# Patient Record
Sex: MALE | Race: WHITE | Employment: OTHER | URBAN - METROPOLITAN AREA
[De-identification: names, ages, dates, MRNs, and addresses within clinical notes are randomized per-mention and may not be internally consistent; named-entity substitution may affect disease eponyms.]

---

## 2020-09-20 ENCOUNTER — ANCILLARY PROCEDURE (OUTPATIENT)
Dept: GENERAL RADIOLOGY | Facility: CLINIC | Age: 65
End: 2020-09-20
Attending: PHYSICIAN ASSISTANT

## 2020-09-20 ENCOUNTER — NURSE TRIAGE (OUTPATIENT)
Dept: NURSING | Facility: CLINIC | Age: 65
End: 2020-09-20

## 2020-09-20 ENCOUNTER — OFFICE VISIT (OUTPATIENT)
Dept: URGENT CARE | Facility: URGENT CARE | Age: 65
End: 2020-09-20

## 2020-09-20 VITALS
HEART RATE: 76 BPM | RESPIRATION RATE: 16 BRPM | SYSTOLIC BLOOD PRESSURE: 140 MMHG | OXYGEN SATURATION: 97 % | TEMPERATURE: 97.3 F | WEIGHT: 204.4 LBS | DIASTOLIC BLOOD PRESSURE: 94 MMHG | BODY MASS INDEX: 29.33 KG/M2

## 2020-09-20 DIAGNOSIS — S69.92XA INJURY OF LEFT THUMB, INITIAL ENCOUNTER: Primary | ICD-10-CM

## 2020-09-20 DIAGNOSIS — S60.10XA SUBUNGUAL HEMATOMA OF FINGER, INITIAL ENCOUNTER: ICD-10-CM

## 2020-09-20 DIAGNOSIS — S62.525A CLOSED NONDISPLACED FRACTURE OF DISTAL PHALANX OF LEFT THUMB, INITIAL ENCOUNTER: ICD-10-CM

## 2020-09-20 DIAGNOSIS — S69.92XA INJURY OF LEFT THUMB, INITIAL ENCOUNTER: ICD-10-CM

## 2020-09-20 PROCEDURE — 99204 OFFICE O/P NEW MOD 45 MIN: CPT | Performed by: PHYSICIAN ASSISTANT

## 2020-09-20 PROCEDURE — 73140 X-RAY EXAM OF FINGER(S): CPT | Mod: LT

## 2020-09-20 RX ORDER — TRAMADOL HYDROCHLORIDE 50 MG/1
50 TABLET ORAL EVERY 6 HOURS PRN
Qty: 10 TABLET | Refills: 0 | Status: SHIPPED | OUTPATIENT
Start: 2020-09-20 | End: 2020-09-23

## 2020-09-20 ASSESSMENT — ENCOUNTER SYMPTOMS
ARTHRALGIAS: 1
COLOR CHANGE: 1

## 2020-09-20 NOTE — PATIENT INSTRUCTIONS
Patient Education     Closed Thumb Fracture  You have a broken (fractured) thumb. This causes local pain, swelling, and often bruising. This injury will usually take about 4 to 6 weeks or longer to heal. Thumb fractures may be treated with a splint or cast. This protects the thumb and holds the bone in place while it heals. More serious fractures may need surgery.     If the thumbnail has been severely injured, it may fall off in 1 to 2 weeks. A new thumbnail will usually start to grow back within a month.  Home care  Follow these guidelines when caring for yourself at home:    Keep your arm elevated to reduce pain and swelling. When sitting or lying down elevate your arm above the level of your heart. You can do this by placing your arm on a pillow that rests on your chest or on a pillow at your side. This is most important during the first 2 days (48 hours) after the injury.    Put an ice pack on the injured area. Do this for 20 minutes every 1 to 2 hours the first day for pain relief. You can make an ice pack by wrapping a plastic bag of ice cubes in a thin towel. As the ice melts, be careful that the cast or splint doesn t get wet. Continue using the ice pack 3 to 4 times a day for the next 2 days. Then use the ice pack as needed to ease pain and swelling.    If a splint was put on, leave this in place for the time advised. This will keep the bones from moving out of position.    Keep the cast or splint completely dry at all times. Bathe with your cast or splint out of the water. Protect it with a large plastic bag, rubber-banded at the top end. If a fiberglass cast or splint gets wet, you can dry it with a hair dryer.    You may use acetaminophen or ibuprofen to control pain, unless another pain medicine was prescribed. If you have chronic liver or kidney disease, talk with your healthcare provider before using these medicines. Also talk with your provider if you ve had a stomach ulcer or gastrointestinal  bleeding.    Don t put creams or objects under the cast if you have itching.  Follow-up care  Follow up with your healthcare provider in 1 week, or as advised. This is to make sure the bone is healing the way it should. Talk with your provider about when it is safe to return to sports or work.  X-rays may be taken. You will be told of any new findings that may affect your care.  When to seek medical advice  Call your healthcare provider right away if any of these occur:    The cast or splint cracks    The plaster cast or splint becomes wet or soft    The fiberglass cast or splint stays wet for more than 24 hours    Bad odor from the cast or wound fluid stains the cast    Pain or swelling gets worse    Redness or warmth in the hand    Fingers or hand become cold, blue, numb, or tingly    You can t move your hand or fingers    Skin around cast or splint becomes red    Fever of 100.4 F (38 C) or higher, or as directed by your healthcare provider  Date Last Reviewed: 2/1/2017 2000-2019 [employer]. [address] [place] [address]. All rights reserved. This information is not intended as a substitute for professional medical care. Always follow your healthcare professional's instructions.           Patient Education     Subungual Hematoma  A subungual hematoma is blood under the nail. It can occur when your finger or toe is hit or crushed. It causes the nail to look blue. In some cases, you may fracture the bone under the nail.   If the bruise is small and not too painful, it will heal without treatment. If the bruise is large and painful, you may need to have the blood drained.  If a large area of the nail is damaged, your doctor may want to remove it. If he or she does not remove the nail, it may become loose or fall off in the next 2 weeks. In almost all cases, the nail will grow back from the area under the cuticle called the matrix. This takes a few weeks to start and is complete in about  4 to 6 months for a fingernail and 12 months for a toenail. If the nail bed or matrix was damaged, the nail may grow back with a rough or irregular shape. Sometimes the nail may not regrow at all.  Home care  The following guidelines will help you care for your wound at home:    Apply an ice pack (ice cubes in a plastic bag, wrapped in a thin towel) for no more than 20 minutes every 3 to 6 hours for the first 1 to 2 days. Continue this, as needed, 3 to 4 times a day until the pain and swelling goes away.    If the nail was drained:  ? Keep the nail covered with a clean adhesive bandage for the next 2 days. There may be some oozing of blood during that time, so change the bandage as needed.  ? Rinse the finger or toe once a day under warm running water. Clean any crust away with a cotton-tipped applicator soaked in soapy water.    If the nail was removed:  ? The nail bed (tissue under the nail) is moist, soft and sensitive. This needs to be protected from injury for the first 7 to 10 days until it dries out and becomes hard. Keep it covered with a dressing or adhesive bandage until that time.    Bandages tend to stick to a newly exposed nail bed and can be hard to remove if left in place more than 24 hours. Therefore, unless you were told otherwise, change dressings every 24 hours. Apply petroleum jelly and then a non-stick dressing. This will keep the bandage from sticking and make it easier to remove.  If necessary, soak the dressing off while holding your finger or toe under warm running water.    If an X-ray showed a fracture, protect the finger or toe for 3 to 4 weeks while it is healing.  Here is some information regarding medicine and your wound:    You can take over-the-counter pain medicine such as acetaminophen for pain, unless you were given a different pain medicine to use. Talk with your healthcare provider before using this medicine if you have chronic liver or kidney disease. Also talk with your  provider if you have had a stomach ulcer or digestive tract bleeding, or you are taking blood-thinner medicine.    If you were given antibiotics, take them until they are used up. It is important to finish the antibiotics even if the wound looks better. This will ensure the infection has cleared.  Follow-up care  Follow up with your doctor, or as advised. If the nail was drained, there is a small risk of infection. Watch carefully for the signs listed below.  When to seek medical advice  Call your doctor right away if any of these occur:    Increasing redness around the nail    Increasing local pain or swelling    Pus draining from the nail    Fever of 100.4 F (38 C) or higher, or as directed by your healthcare provider  Date Last Reviewed: 9/1/2016 2000-2019 [employer]. [address] [place] [address], [place] [address]. All rights reserved. This information is not intended as a substitute for professional medical care. Always follow your healthcare professional's instructions.

## 2020-09-20 NOTE — PROGRESS NOTES
SUBJECTIVE:   Alfonso Ferris is a 64 year old male presenting with a chief complaint of   Chief Complaint   Patient presents with     Thumb Discomfort     Happened last night sledge hammer left thumb       He is a new patient of [facility].  Patient hit with 5 lb sledge hammer against a log.  Incident occurred at 9 pm last night.  Patient took tylenol last night.  Been icing.  No previous injury.  No other injuries.  RHD.      PMHx, surgeries and medications reviewed.        Review of Systems   Musculoskeletal: Positive for arthralgias.   Skin: Positive for color change.   All other systems reviewed and are negative.      Past Medical History:   Diagnosis Date     Arthritis     DJD hips     Pain in left hip      History reviewed. No pertinent family history.  Current Outpatient Medications   Medication Sig Dispense Refill     traMADol (ULTRAM) 50 MG tablet Take 1 tablet (50 mg) by mouth every 6 hours as needed for severe pain 10 tablet 0     acetaminophen (TYLENOL) 500 MG tablet Take 1-2 tablets by mouth every 6 hours as needed.       aspirin 81 MG EC tablet Take 1 tablet by mouth daily.  3     bisacodyl (DULCOLAX) 10 MG suppository Place 1 suppository rectally daily as needed. (Patient not taking: Reported on 9/20/2020) 2 suppository 0     enoxaparin (LOVENOX) 40 MG/0.4ML SOLN Inject 0.4 mLs Subcutaneous every 24 hours. (Patient not taking: Reported on 9/20/2020) 5 Syringe 0     miconazole (MICATIN; MICRO GUARD) 2 % powder Apply  topically 2 times daily. After washing and drying 70 g      oxyCODONE (ROXICODONE) 5 MG immediate release tablet Take 1-2 tablets by mouth every 4 hours as needed. (Patient not taking: Reported on 9/20/2020) 80 tablet      senna-docusate (SENOKOT-S;PERICOLACE) 8.6-50 MG per tablet Take 2 tablets by mouth 2 times daily. (Patient not taking: Reported on 9/20/2020) 40 tablet o     Social History     Tobacco Use     Smoking status: Never Smoker     Smokeless tobacco: Never Used   Substance Use  Topics     Alcohol use: Yes     Comment: one-two per week       OBJECTIVE  BP (!) 140/94   Pulse 76   Temp 97.3  F (36.3  C) (Tympanic)   Resp 16   Wt 92.7 kg (204 lb 6.4 oz)   SpO2 97%   BMI 29.33 kg/m      Physical Exam  Vitals signs and nursing note reviewed.   Constitutional:       Appearance: Normal appearance. He is normal weight.   Eyes:      Extraocular Movements: Extraocular movements intact.      Conjunctiva/sclera: Conjunctivae normal.   Cardiovascular:      Rate and Rhythm: Normal rate.   Musculoskeletal:      Comments: Left hand thumb:  Nail discoloration with edema to entire thumb.  Painful ROM.  Thumb opposition intact but decreased.     Skin:     Findings: Bruising present.      Comments: Thumb nail and surrounding tissue with ecchymosis.     Neurological:      General: No focal deficit present.      Mental Status: He is alert.   Psychiatric:         Mood and Affect: Mood normal.         Labs:  No results found for this or any previous visit (from the past 24 hour(s)).    X-Ray was done, my findings are: Tuft fracture,  Xrays personally viewed by myself.      ASSESSMENT:      ICD-10-CM    1. Injury of left thumb, initial encounter  S69.92XA XR Finger Left G/E 2 Views   2. Closed nondisplaced fracture of distal phalanx of left thumb, initial encounter  S62.525A Orthopedic & Spine  Referral     traMADol (ULTRAM) 50 MG tablet   3. Subungual hematoma of finger, initial encounter  S60.10XA traMADol (ULTRAM) 50 MG tablet        Medical Decision Making:    Differential Diagnosis:  MS Injury Pain: fracture, contusion and subungual hematoma.     Serious Comorbid Conditions:  Adult:  None    PLAN:    MS Injury/Pain  Ultram, ortho referral.  Patient placed in a thumb splint.      Followup:    If not improving or if condition worsens, follow up with your Primary Care Provider, In 2  day(s) follow up with  Ortho    Patient Instructions     Patient Education     Closed Thumb Fracture  You have a  broken (fractured) thumb. This causes local pain, swelling, and often bruising. This injury will usually take about 4 to 6 weeks or longer to heal. Thumb fractures may be treated with a splint or cast. This protects the thumb and holds the bone in place while it heals. More serious fractures may need surgery.     If the thumbnail has been severely injured, it may fall off in 1 to 2 weeks. A new thumbnail will usually start to grow back within a month.  Home care  Follow these guidelines when caring for yourself at home:    Keep your arm elevated to reduce pain and swelling. When sitting or lying down elevate your arm above the level of your heart. You can do this by placing your arm on a pillow that rests on your chest or on a pillow at your side. This is most important during the first 2 days (48 hours) after the injury.    Put an ice pack on the injured area. Do this for 20 minutes every 1 to 2 hours the first day for pain relief. You can make an ice pack by wrapping a plastic bag of ice cubes in a thin towel. As the ice melts, be careful that the cast or splint doesn t get wet. Continue using the ice pack 3 to 4 times a day for the next 2 days. Then use the ice pack as needed to ease pain and swelling.    If a splint was put on, leave this in place for the time advised. This will keep the bones from moving out of position.    Keep the cast or splint completely dry at all times. Bathe with your cast or splint out of the water. Protect it with a large plastic bag, rubber-banded at the top end. If a fiberglass cast or splint gets wet, you can dry it with a hair dryer.    You may use acetaminophen or ibuprofen to control pain, unless another pain medicine was prescribed. If you have chronic liver or kidney disease, talk with your healthcare provider before using these medicines. Also talk with your provider if you ve had a stomach ulcer or gastrointestinal bleeding.    Don t put creams or objects under the cast if  you have itching.  Follow-up care  Follow up with your healthcare provider in 1 week, or as advised. This is to make sure the bone is healing the way it should. Talk with your provider about when it is safe to return to sports or work.  X-rays may be taken. You will be told of any new findings that may affect your care.  When to seek medical advice  Call your healthcare provider right away if any of these occur:    The cast or splint cracks    The plaster cast or splint becomes wet or soft    The fiberglass cast or splint stays wet for more than 24 hours    Bad odor from the cast or wound fluid stains the cast    Pain or swelling gets worse    Redness or warmth in the hand    Fingers or hand become cold, blue, numb, or tingly    You can t move your hand or fingers    Skin around cast or splint becomes red    Fever of 100.4 F (38 C) or higher, or as directed by your healthcare provider  Date Last Reviewed: 2/1/2017 2000-2019 [employer]. [address] [place] [address]. All rights reserved. This information is not intended as a substitute for professional medical care. Always follow your healthcare professional's instructions.           Patient Education     Subungual Hematoma  A subungual hematoma is blood under the nail. It can occur when your finger or toe is hit or crushed. It causes the nail to look blue. In some cases, you may fracture the bone under the nail.   If the bruise is small and not too painful, it will heal without treatment. If the bruise is large and painful, you may need to have the blood drained.  If a large area of the nail is damaged, your doctor may want to remove it. If he or she does not remove the nail, it may become loose or fall off in the next 2 weeks. In almost all cases, the nail will grow back from the area under the cuticle called the matrix. This takes a few weeks to start and is complete in about 4 to 6 months for a fingernail and 12 months for a toenail.  If the nail bed or matrix was damaged, the nail may grow back with a rough or irregular shape. Sometimes the nail may not regrow at all.  Home care  The following guidelines will help you care for your wound at home:    Apply an ice pack (ice cubes in a plastic bag, wrapped in a thin towel) for no more than 20 minutes every 3 to 6 hours for the first 1 to 2 days. Continue this, as needed, 3 to 4 times a day until the pain and swelling goes away.    If the nail was drained:  ? Keep the nail covered with a clean adhesive bandage for the next 2 days. There may be some oozing of blood during that time, so change the bandage as needed.  ? Rinse the finger or toe once a day under warm running water. Clean any crust away with a cotton-tipped applicator soaked in soapy water.    If the nail was removed:  ? The nail bed (tissue under the nail) is moist, soft and sensitive. This needs to be protected from injury for the first 7 to 10 days until it dries out and becomes hard. Keep it covered with a dressing or adhesive bandage until that time.    Bandages tend to stick to a newly exposed nail bed and can be hard to remove if left in place more than 24 hours. Therefore, unless you were told otherwise, change dressings every 24 hours. Apply petroleum jelly and then a non-stick dressing. This will keep the bandage from sticking and make it easier to remove.  If necessary, soak the dressing off while holding your finger or toe under warm running water.    If an X-ray showed a fracture, protect the finger or toe for 3 to 4 weeks while it is healing.  Here is some information regarding medicine and your wound:    You can take over-the-counter pain medicine such as acetaminophen for pain, unless you were given a different pain medicine to use. Talk with your healthcare provider before using this medicine if you have chronic liver or kidney disease. Also talk with your provider if you have had a stomach ulcer or digestive tract  bleeding, or you are taking blood-thinner medicine.    If you were given antibiotics, take them until they are used up. It is important to finish the antibiotics even if the wound looks better. This will ensure the infection has cleared.  Follow-up care  Follow up with your doctor, or as advised. If the nail was drained, there is a small risk of infection. Watch carefully for the signs listed below.  When to seek medical advice  Call your doctor right away if any of these occur:    Increasing redness around the nail    Increasing local pain or swelling    Pus draining from the nail    Fever of 100.4 F (38 C) or higher, or as directed by your healthcare provider  Date Last Reviewed: 9/1/2016 2000-2019 [employer]. [address] [place] [address], [place] [address]. All rights reserved. This information is not intended as a substitute for professional medical care. Always follow your healthcare professional's instructions.

## 2020-09-20 NOTE — NURSING NOTE
"Vital signs:  Temp: 97.3  F (36.3  C) Temp src: Tympanic BP: (!) 140/94 Pulse: 76   Resp: 16 SpO2: 97 %       Weight: 92.7 kg (204 lb 6.4 oz)  Estimated body mass index is 29.33 kg/m  as calculated from the following:    Height as of 7/2/12: 1.778 m (5' 10\").    Weight as of this encounter: 92.7 kg (204 lb 6.4 oz).        "

## 2020-09-21 NOTE — TELEPHONE ENCOUNTER
Alfonso and his wife Ya call in to report that the  Tramadol he was prescribed for his thumb injury is not touching the pain.  He broke it in 3 places.   He will need to be reevaluated or contact his provider or contact the orthopedic specialist to get other pain  Medication prescribed. It won't be done by an on call provider and I can not advise doubling his dose of tramadol.  They agree to explore their other options.     Reason for Disposition    [1] SEVERE pain (e.g., excruciating, pain scale 8-10) AND [2] not improved after pain medications    Protocols used: RECENT MEDICAL VISIT FOR INJURY FOLLOW-UP CALL-A-

## 2020-09-22 ENCOUNTER — OFFICE VISIT (OUTPATIENT)
Dept: ORTHOPEDICS | Facility: CLINIC | Age: 65
End: 2020-09-22
Attending: PHYSICIAN ASSISTANT

## 2020-09-22 VITALS
HEIGHT: 71 IN | DIASTOLIC BLOOD PRESSURE: 82 MMHG | WEIGHT: 205 LBS | SYSTOLIC BLOOD PRESSURE: 125 MMHG | BODY MASS INDEX: 28.7 KG/M2

## 2020-09-22 DIAGNOSIS — S62.525A CLOSED NONDISPLACED FRACTURE OF DISTAL PHALANX OF LEFT THUMB, INITIAL ENCOUNTER: ICD-10-CM

## 2020-09-22 PROCEDURE — 99203 OFFICE O/P NEW LOW 30 MIN: CPT | Performed by: FAMILY MEDICINE

## 2020-09-22 ASSESSMENT — MIFFLIN-ST. JEOR: SCORE: 1747

## 2020-09-22 NOTE — PROGRESS NOTES
[facility]   Clinic Visit s Sep 22, 2020    PCP: [facility], [place]    ASSESSMENT/PLAN    ICD-10-CM    1. Closed nondisplaced fracture of distal phalanx of left thumb, initial encounter  S62.525A Orthopedic & Spine  Referral     SPLINT FINGER STAXX SIZE 5     Comminuted but minimally displaced tuft fracture of the left thumb, not appearing to involve the IP joint.  There is some bruising under the nail and erythema about the skin but no clear skin disruption is apparent at this time.  Will treat with splinting, and he has an AlumaFoam splint that he may alternate with a stack splint given today.  May remove for bathing and light activity, otherwise recheck with repeat x-ray in 3 weeks.          Today's Visit:  Alfonso is a 64 year old male who is seen in consultation at the request of Dr. Starks for   Chief Complaint   Patient presents with     Left Hand - Pain       Injury: Patient describes injury as of Saturday night, 830pm, smashed with a sledge hammer.  Patient was splitting l;ogs for fireplace and hit thumb.      Right hand dominant    Location of Pain: left thumb dip joint, nonradiating   Duration of Pain: acute, 5 day(s),   Rating of Pain at worst: 10/10  Rating of Pain Currently: 2/10  Pain is better with: splinted   Pain is worse with: nothing  Treatment so far consists of: ice occasionally, splinted  Associated symptoms: pain  Recent imaging completed: X-rays completed Sunday 9/20.  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       Past Medical History:   Diagnosis Date     Arthritis     DJD hips     Pain in left hip        Patient Active Problem List    Diagnosis Date Noted     OA (osteoarthritis) 07/02/2012     Priority: Medium       Family History   Problem Relation Age of Onset     Coronary Artery Disease Mother      Diabetes Mother      Hypertension Mother      Colon Cancer Father      Cerebrovascular  "Disease Maternal Grandmother        Social History     Socioeconomic History     Marital status:      Spouse name: Not on file     Number of children: Not on file     Years of education: Not on file     Highest education level: Not on file   Occupational History     Not on file   Social Needs     Financial resource strain: Not on file     Food insecurity     Worry: Not on file     Inability: Not on file     Transportation needs     Medical: Not on file     Non-medical: Not on file   Tobacco Use     Smoking status: Never Smoker     Smokeless tobacco: Never Used   Substance and Sexual Activity     Alcohol use: Yes     Comment: one-two per week       Past Surgical History:   Procedure Laterality Date     ARTHROPLASTY MINIMALLY INVASIVE HIP  7/2/2012    Procedure: ARTHROPLASTY MINIMALLY INVASIVE HIP;  Left Total Hip Arthroplasty Minimally Invasive Two Incision;  Surgeon: Ulises Oliver MD;  Location: [facility] OR             Review of Systems   Musculoskeletal: Positive for joint pain.   All other systems reviewed and are negative.        Physical Exam    /82   Ht 1.803 m (5' 11\")   Wt 93 kg (205 lb)   BMI 28.59 kg/m    Constitutional:well-developed, well-nourished, and in no distress.   Cardiovascular: Intact distal pulses.    Neurological: alert. Gait Normal:   Gait, station, stance, and balance appear normal for age  Skin: Skin is warm and dry.   Psychiatric: Mood and affect normal.   Respiratory: unlabored, speaks in full sentences  Lymph: no LAD, no lymphangitis    Left Hand Exam     Tenderness   Left hand tenderness location: Distal phalanx thumb.     Range of Motion   Wrist   Extension: normal   Flexion: normal   Pronation: normal   Supination: normal   Hand   MP Thumb: 20   DIP Thumb: 0     Muscle Strength   The patient has normal left wrist strength.    Other   Erythema: absent  Scars: absent  Sensation: decreased  Pulse: present    Comments:  Subungual bruising.  No significant erythema or " purulence.  Moderate swelling about the thumb, mostly distal to IP joint.  Decreased sensation but intact to pressure.                 X-ray images Previously done and independently reviewed by me in the office today with the patient. X-ray shows:   Recent Results (from the past 744 hour(s))   XR Finger Left G/E 2 Views    Narrative    LEFT FINGER TWO OR MORE VIEWS  9/20/2020 10:48 AM     HISTORY: Injury of left thumb, initial encounter.    COMPARISON: None.      Impression    IMPRESSION: Comminuted and minimally distracted fracture through the  distal phalanx of the first digit. There are two prominent fracture  fragments.    TANNER BUI MD

## 2020-09-22 NOTE — LETTER
9/22/2020         RE: Alfonso Ferris  [address]  [facility] [address]        Dear Colleague,    Thank you for referring your patient, Alfonso Ferris, to the  [facility]. Please see a copy of my visit note below.    [facility]   Clinic Visit s Sep 22, 2020    PCP: [facility] [place]    ASSESSMENT/PLAN    ICD-10-CM    1. Closed nondisplaced fracture of distal phalanx of left thumb, initial encounter  S62.525A Orthopedic & Spine  Referral     SPLINT FINGER STAXX SIZE 5     Comminuted but minimally displaced tuft fracture of the left thumb, not appearing to involve the IP joint.  There is some bruising under the nail and erythema about the skin but no clear skin disruption is apparent at this time.  Will treat with splinting, and he has an AlumaFoam splint that he may alternate with a stack splint given today.  May remove for bathing and light activity, otherwise recheck with repeat x-ray in 3 weeks.          Today's Visit:  Alfonso is a 64 year old male who is seen in consultation at the request of Dr. Starks for   Chief Complaint   Patient presents with     Left Hand - Pain       Injury: Patient describes injury as of Saturday night, 830pm, smashed with a sledge hammer.  Patient was splitting l;ogs for fireplace and hit thumb.      Right hand dominant    Location of Pain: left thumb dip joint, nonradiating   Duration of Pain: acute, 5 day(s),   Rating of Pain at worst: 10/10  Rating of Pain Currently: 2/10  Pain is better with: splinted   Pain is worse with: nothing  Treatment so far consists of: ice occasionally, splinted  Associated symptoms: pain  Recent imaging completed: X-rays completed Sunday 9/20.  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       Past Medical History:   Diagnosis Date     Arthritis     DJD hips     Pain in left hip        Patient Active Problem List    Diagnosis Date  "Noted     OA (osteoarthritis) 07/02/2012     Priority: Medium       Family History   Problem Relation Age of Onset     Coronary Artery Disease Mother      Diabetes Mother      Hypertension Mother      Colon Cancer Father      Cerebrovascular Disease Maternal Grandmother        Social History     Socioeconomic History     Marital status:      Spouse name: Not on file     Number of children: Not on file     Years of education: Not on file     Highest education level: Not on file   Occupational History     Not on file   Social Needs     Financial resource strain: Not on file     Food insecurity     Worry: Not on file     Inability: Not on file     Transportation needs     Medical: Not on file     Non-medical: Not on file   Tobacco Use     Smoking status: Never Smoker     Smokeless tobacco: Never Used   Substance and Sexual Activity     Alcohol use: Yes     Comment: one-two per week       Past Surgical History:   Procedure Laterality Date     ARTHROPLASTY MINIMALLY INVASIVE HIP  7/2/2012    Procedure: ARTHROPLASTY MINIMALLY INVASIVE HIP;  Left Total Hip Arthroplasty Minimally Invasive Two Incision;  Surgeon: Ulises Oliver MD;  Location: [facility]             Review of Systems   Musculoskeletal: Positive for joint pain.   All other systems reviewed and are negative.        Physical Exam    /82   Ht 1.803 m (5' 11\")   Wt 93 kg (205 lb)   BMI 28.59 kg/m    Constitutional:well-developed, well-nourished, and in no distress.   Cardiovascular: Intact distal pulses.    Neurological: alert. Gait Normal:   Gait, station, stance, and balance appear normal for age  Skin: Skin is warm and dry.   Psychiatric: Mood and affect normal.   Respiratory: unlabored, speaks in full sentences  Lymph: no LAD, no lymphangitis    Left Hand Exam     Tenderness   Left hand tenderness location: Distal phalanx thumb.     Range of Motion   Wrist   Extension: normal   Flexion: normal   Pronation: normal   Supination: normal   Hand "   MP Thumb: 20   DIP Thumb: 0     Muscle Strength   The patient has normal left wrist strength.    Other   Erythema: absent  Scars: absent  Sensation: decreased  Pulse: present    Comments:  Subungual bruising.  No significant erythema or purulence.  Moderate swelling about the thumb, mostly distal to IP joint.  Decreased sensation but intact to pressure.                 X-ray images Previously done and independently reviewed by me in the office today with the patient. X-ray shows:   Recent Results (from the past 744 hour(s))   XR Finger Left G/E 2 Views    Narrative    LEFT FINGER TWO OR MORE VIEWS  9/20/2020 10:48 AM     HISTORY: Injury of left thumb, initial encounter.    COMPARISON: None.      Impression    IMPRESSION: Comminuted and minimally distracted fracture through the  distal phalanx of the first digit. There are two prominent fracture  fragments.    TANNER BUI MD           Again, thank you for allowing me to participate in the care of your patient.        Sincerely,        Sidney Harper MD

## 2020-10-11 PROCEDURE — 99285 EMERGENCY DEPT VISIT HI MDM: CPT | Mod: 25

## 2020-10-11 PROCEDURE — 96374 THER/PROPH/DIAG INJ IV PUSH: CPT

## 2020-10-11 PROCEDURE — 96361 HYDRATE IV INFUSION ADD-ON: CPT

## 2020-10-11 PROCEDURE — 96375 TX/PRO/DX INJ NEW DRUG ADDON: CPT

## 2020-10-12 ENCOUNTER — HOSPITAL ENCOUNTER (EMERGENCY)
Facility: CLINIC | Age: 65
Discharge: HOME | End: 2020-10-12
Attending: EMERGENCY MEDICINE | Admitting: EMERGENCY MEDICINE

## 2020-10-12 ENCOUNTER — APPOINTMENT (OUTPATIENT)
Dept: CT IMAGING | Facility: CLINIC | Age: 65
End: 2020-10-12
Attending: EMERGENCY MEDICINE

## 2020-10-12 VITALS
OXYGEN SATURATION: 100 % | DIASTOLIC BLOOD PRESSURE: 79 MMHG | BODY MASS INDEX: 28 KG/M2 | TEMPERATURE: 98.1 F | SYSTOLIC BLOOD PRESSURE: 122 MMHG | HEIGHT: 71 IN | WEIGHT: 200 LBS | RESPIRATION RATE: 22 BRPM | HEART RATE: 54 BPM

## 2020-10-12 DIAGNOSIS — N20.1 URETEROLITHIASIS: ICD-10-CM

## 2020-10-12 DIAGNOSIS — N23 RENAL COLIC ON RIGHT SIDE: ICD-10-CM

## 2020-10-12 LAB
ALBUMIN UR-MCNC: NEGATIVE MG/DL
ANION GAP SERPL CALCULATED.3IONS-SCNC: 9 MMOL/L (ref 3–14)
APPEARANCE UR: CLEAR
BASOPHILS # BLD AUTO: 0 10E9/L (ref 0–0.2)
BASOPHILS NFR BLD AUTO: 0.1 %
BILIRUB UR QL STRIP: NEGATIVE
BUN SERPL-MCNC: 17 MG/DL (ref 7–30)
CALCIUM SERPL-MCNC: 8.5 MG/DL (ref 8.5–10.1)
CHLORIDE SERPL-SCNC: 102 MMOL/L (ref 94–109)
CO2 SERPL-SCNC: 23 MMOL/L (ref 20–32)
COLOR UR AUTO: YELLOW
CREAT SERPL-MCNC: 1.23 MG/DL (ref 0.66–1.25)
DIFFERENTIAL METHOD BLD: ABNORMAL
EOSINOPHIL # BLD AUTO: 0.1 10E9/L (ref 0–0.7)
EOSINOPHIL NFR BLD AUTO: 0.7 %
ERYTHROCYTE [DISTWIDTH] IN BLOOD BY AUTOMATED COUNT: 12.6 % (ref 10–15)
GFR SERPL CREATININE-BSD FRML MDRD: 62 ML/MIN/{1.73_M2}
GLUCOSE SERPL-MCNC: 136 MG/DL (ref 70–99)
GLUCOSE UR STRIP-MCNC: NEGATIVE MG/DL
HCT VFR BLD AUTO: 40.2 % (ref 40–53)
HGB BLD-MCNC: 13.8 G/DL (ref 13.3–17.7)
HGB UR QL STRIP: NEGATIVE
IMM GRANULOCYTES # BLD: 0 10E9/L (ref 0–0.4)
IMM GRANULOCYTES NFR BLD: 0.2 %
KETONES UR STRIP-MCNC: 10 MG/DL
LEUKOCYTE ESTERASE UR QL STRIP: NEGATIVE
LYMPHOCYTES # BLD AUTO: 1.1 10E9/L (ref 0.8–5.3)
LYMPHOCYTES NFR BLD AUTO: 8.7 %
MCH RBC QN AUTO: 29.9 PG (ref 26.5–33)
MCHC RBC AUTO-ENTMCNC: 34.3 G/DL (ref 31.5–36.5)
MCV RBC AUTO: 87 FL (ref 78–100)
MONOCYTES # BLD AUTO: 0.7 10E9/L (ref 0–1.3)
MONOCYTES NFR BLD AUTO: 5.5 %
NEUTROPHILS # BLD AUTO: 10.2 10E9/L (ref 1.6–8.3)
NEUTROPHILS NFR BLD AUTO: 84.8 %
NITRATE UR QL: NEGATIVE
NRBC # BLD AUTO: 0 10*3/UL
NRBC BLD AUTO-RTO: 0 /100
PH UR STRIP: 6.5 PH (ref 5–7)
PLATELET # BLD AUTO: 209 10E9/L (ref 150–450)
POTASSIUM SERPL-SCNC: 3.5 MMOL/L (ref 3.4–5.3)
RBC # BLD AUTO: 4.61 10E12/L (ref 4.4–5.9)
RBC #/AREA URNS AUTO: 2 /HPF (ref 0–2)
SODIUM SERPL-SCNC: 134 MMOL/L (ref 133–144)
SOURCE: ABNORMAL
SP GR UR STRIP: 1.02 (ref 1–1.03)
SQUAMOUS #/AREA URNS AUTO: <1 /HPF (ref 0–1)
UROBILINOGEN UR STRIP-MCNC: NORMAL MG/DL (ref 0–2)
WBC # BLD AUTO: 12 10E9/L (ref 4–11)
WBC #/AREA URNS AUTO: 1 /HPF (ref 0–5)

## 2020-10-12 PROCEDURE — 258N000003 HC RX IP 258 OP 636: Performed by: EMERGENCY MEDICINE

## 2020-10-12 PROCEDURE — 80048 BASIC METABOLIC PNL TOTAL CA: CPT | Performed by: EMERGENCY MEDICINE

## 2020-10-12 PROCEDURE — 250N000013 HC RX MED GY IP 250 OP 250 PS 637: Performed by: EMERGENCY MEDICINE

## 2020-10-12 PROCEDURE — 74176 CT ABD & PELVIS W/O CONTRAST: CPT

## 2020-10-12 PROCEDURE — 250N000011 HC RX IP 250 OP 636: Performed by: EMERGENCY MEDICINE

## 2020-10-12 PROCEDURE — 81001 URINALYSIS AUTO W/SCOPE: CPT | Performed by: EMERGENCY MEDICINE

## 2020-10-12 PROCEDURE — 85025 COMPLETE CBC W/AUTO DIFF WBC: CPT | Performed by: EMERGENCY MEDICINE

## 2020-10-12 RX ORDER — SODIUM CHLORIDE 9 MG/ML
INJECTION, SOLUTION INTRAVENOUS CONTINUOUS
Status: DISCONTINUED | OUTPATIENT
Start: 2020-10-12 | End: 2020-10-12 | Stop reason: HOSPADM

## 2020-10-12 RX ORDER — ONDANSETRON 4 MG/1
4-8 TABLET, ORALLY DISINTEGRATING ORAL EVERY 8 HOURS PRN
Qty: 18 TABLET | Refills: 0 | Status: SHIPPED | OUTPATIENT
Start: 2020-10-12 | End: 2020-10-15

## 2020-10-12 RX ORDER — MORPHINE SULFATE 4 MG/ML
4 INJECTION, SOLUTION INTRAMUSCULAR; INTRAVENOUS
Status: DISCONTINUED | OUTPATIENT
Start: 2020-10-12 | End: 2020-10-12 | Stop reason: HOSPADM

## 2020-10-12 RX ORDER — ONDANSETRON 2 MG/ML
4 INJECTION INTRAMUSCULAR; INTRAVENOUS EVERY 30 MIN PRN
Status: DISCONTINUED | OUTPATIENT
Start: 2020-10-12 | End: 2020-10-12 | Stop reason: HOSPADM

## 2020-10-12 RX ORDER — TAMSULOSIN HYDROCHLORIDE 0.4 MG/1
0.4 CAPSULE ORAL ONCE
Status: COMPLETED | OUTPATIENT
Start: 2020-10-12 | End: 2020-10-12

## 2020-10-12 RX ORDER — TAMSULOSIN HYDROCHLORIDE 0.4 MG/1
0.4 CAPSULE ORAL DAILY
Qty: 10 CAPSULE | Refills: 0 | Status: SHIPPED | OUTPATIENT
Start: 2020-10-12 | End: 2020-10-22

## 2020-10-12 RX ADMIN — MORPHINE SULFATE 4 MG: 4 INJECTION, SOLUTION INTRAMUSCULAR; INTRAVENOUS at 00:22

## 2020-10-12 RX ADMIN — TAMSULOSIN HYDROCHLORIDE 0.4 MG: 0.4 CAPSULE ORAL at 01:33

## 2020-10-12 RX ADMIN — SODIUM CHLORIDE 1000 ML: 9 INJECTION, SOLUTION INTRAVENOUS at 00:25

## 2020-10-12 RX ADMIN — ONDANSETRON 4 MG: 2 INJECTION INTRAMUSCULAR; INTRAVENOUS at 00:22

## 2020-10-12 ASSESSMENT — ENCOUNTER SYMPTOMS
DIFFICULTY URINATING: 1
FEVER: 0
ROS GI COMMENTS: NO BOWEL INCONTINENCE
NAUSEA: 1
VOMITING: 1
HEMATURIA: 0
ABDOMINAL PAIN: 0
FLANK PAIN: 1

## 2020-10-12 ASSESSMENT — MIFFLIN-ST. JEOR: SCORE: 1724.32

## 2020-10-12 NOTE — DISCHARGE INSTRUCTIONS
*You may resume diet and activities.  Drink plenty of fluids.  *Take medications as prescribed.  Ibuprofen and/or tylenol for pain.  Your oxycodone for severe pain not relieved by ibuprofen/tylenol.  Flomax.  Zofran for nausea. Continue your current medications.  *Follow-up with your doctor or urology as directed.  *Return if you develop fever, are unable to urinate, cannot keep fluids down, or become worse in any way.    Discharge Instructions  Kidney Stones    Kidney stones are a common problem that can cause a lot of pain but fortunately are usually not dangerous and can be generally treated with medicine at home.  However, sometimes your condition may be worse than it seemed at first, or may get worse with time.     You need to follow-up with your regular doctor within 3 days.    Most kidney stones will pass on their own, but occasionally stones may need to be removed by an urologist. We will send you home with a urine strainer. Be sure to urinate into this, or urinate into a container and pour the urine through the fine filter to catch the kidney stone as it comes out. The stone will seem like a pebble or grain of sand. Be sure to save this in a zip-lock bag and take it to the doctor s office with you.       Return to the Emergency Department if:  Your pain is not controlled.  You are vomiting and can t keep fluids or medications down.  You develop fever (>101)  You feel much more ill or develop new symptoms  What can I do to help myself?  Be sure to drink plenty of fluids  Staying active is good, and may help the stone to pass. You may do whatever you feel up to doing without restrictions.   Treatment:  Non-steroidal anti-inflammatory drugs (NSAIDs). This includes prescription medicines like Toradol   and non-prescription medicines like ibuprofen (Advil , Nuprin  ). These pain relievers are very effective for kidney stones.  Narcotic pain pills. If you have been given a narcotic (such as codeine, hydrocodone,  or oxycodone) do not drive for four hours after you have taken it. If the narcotic contains acetaminophen (Tylenol), do not take Tylenol with it. All narcotics will cause constipation, so eat a high fiber diet.    Nausea medication.  Nausea and vomiting are common with kidney stones, so your physician may send you home with medicine for this.   Flomax (Tamsulosin). This medicine is sometimes used for men with prostate problems, but also can help kidney stones to pass. This medicine can lower blood pressure, and you may feel faint, especially when you first stand up. Be sure to get up gradually, sit down if you feel faint, and avoid activity where feeling faint would be dangerous, such as climbing ladders.     Remember that you can always come back to the Emergency Department if you are not able to see your regular doctor in the amount of time listed above, if you get any new symptoms, or if there is anything that worries you.      Opioid Medication Information    You have been given a prescription for an opioid (narcotic) pain medicine and/or have received a pain medicine while here in the Emergency Department. These medicines can make you drowsy or impaired. You must not drive, operate dangerous equipment, or engage in any other dangerous activities while taking these medications. If you drive while taking these medications, you could be arrested for DUI, or driving under the influence. Do not drink any alcohol while you are taking these medications.   Opioid pain medications can cause addiction. If you have a history of chemical dependency of any type, you are at a higher risk of becoming addicted to pain medications.  Only take these prescribed medications to treat your pain when all other options have been tried. Take it for as short a time and as few doses as possible. Store your pain pills in a secure place, as they are frequently stolen and provide a dangerous opportunity for children or visitors in your  house to start abusing these powerful medications. We will not replace any lost or stolen medicine.  As soon as your pain is better, you should flush all your remaining medication.   Many prescription pain medications contain Tylenol  (acetaminophen), including Vicodin , Tylenol #3 , Norco , Lortab , and Percocet .  You should not take any extra pills of Tylenol  if you are using these prescription medications or you can get very sick.  Do not ever take more than 4000 mg of acetaminophen in any 24 hour period.  All opioids tend to cause constipation. Drink plenty of water and eat foods that have a lot of fiber, such as fruits, vegetables, prune juice, apple juice and high fiber cereal.  Take a laxative if you don t move your bowels at least every other day. Miralax , Milk of Magnesia, Colace , or Senna  can be used to keep you regular.

## 2020-10-12 NOTE — ED PROVIDER NOTES
"  History     Chief Complaint:  Flank Pain     HPI   Alfonso Ferris is a 64 year old male who presents for evaluation of flank pain. The patient reports that between [id number], he started to develop right sided flank pain that worsened in severity at 1830. He notes that he has had difficulty urinating and after he took 2 Extra Strength Excedrin, he began to experience nausea and vomiting. The patient denies hematuria, fever, bowel or bladder incontinence, or history of kidney stones. The pain does not wrap around to the abdomen.     Allergies:  Bacitracin  Neomycin Sulfate  Polymyxin B    Medications:   aspirin   bisacodyl suppository  Lovenox   miconazole     Past Medical History:    Arthritis   Pain in left hip   Choroidal nevus, left eye  ELIZABETH  Gallstones     Past Surgical History:    Arthoplasty minimally invasive hip    Choleystectomy   Appendectomy     Family History:    Mother: Coronary Artery Disease, diabetes, hypertension   Father: colon cancer  Maternal grandmother: Cerebrovascular Disease     Social History:  The patient was accompanied to the ED by wife.  Smoking Status: Never Smoker  Smokeless Tobacco: Never Used  Alcohol Use: Positive  Drug Use: Negative  PCP: [facility] [place]     Review of Systems   Constitutional: Negative for fever.   Gastrointestinal: Positive for nausea and vomiting. Negative for abdominal pain.        No bowel incontinence    Genitourinary: Positive for difficulty urinating and flank pain. Negative for hematuria.        No bladder incontinence   All other systems reviewed and are negative.    Physical Exam     Patient Vitals for the past 24 hrs:   BP Temp Temp src Pulse Resp SpO2 Height Weight   10/12/20 0100 122/79 -- -- 54 -- 100 % -- --   10/12/20 0009 128/76 98.1  F (36.7  C) Oral 60 22 100 % 1.803 m (5' 11\") 90.7 kg (200 lb)     Physical Exam  General: Well-nourished, appears to be in pain eyes: PERRL, conjunctivae pink no scleral icterus or conjunctival " injection  ENT:  Moist mucus membranes, posterior oropharynx clear without erythema or exudates  Respiratory:  Lungs clear to auscultation bilaterally, no crackles/rubs/wheezes.  Good air movement  CV: Normal rate and rhythm, no murmurs/rubs/gallops  GI:  Abdomen soft and non-distended.  Normoactive BS.  No tenderness, guarding or rebound  Skin: Warm, dry.  No rashes or petechiae  Musculoskeletal: No peripheral edema or calf tenderness  Neuro: Alert and oriented to person/place/time. Normal saddle sensation.  Normal and symmetric strength at BLE.  Psychiatric: Normal affect    Emergency Department Course     Imaging:  Radiology findings were communicated with the patient who voiced understanding of the findings.    CT Abdomen Pelvis w/o Contrast  1.  Mild right hydronephrosis caused by a small ureterovesical junction stone which is poorly seen on this exam due to artifact from bilateral hip arthroplasties.  Reading per radiology      Laboratory:  Laboratory findings were communicated with the patient who voiced understanding of the findings.    UA: Urineketon 10 (A), o/w WNL.      CBC: WBC 12.0 (H), HGB 13.8,   BMP: Glucose 136 (H), o/w WNL (Creatinine 1.23)    Interventions:  0022 morphine 4 mg IV  0022 Zofran 4 mg IV  0025 NS 1000 mL IV  0133 Flomax 0.4 mg PO     Emergency Department Course:    0014 Nursing notes and vitals reviewed. I performed an exam of the patient as documented above.     0026 IV was inserted and blood was drawn for laboratory testing, results above.     0040 The patient was sent for a CT while in the emergency department, results above.     0220 Prior to discharge, I personally reviewed the results with the patient and all related questions were answered. The patient verbalized understanding and is amenable to plan.     Impression & Plan      Medical Decision Making:  Alfonso Ferris is a 64 year old male who presented with unilateral flank and abdominal pain consistent with renal  colic. CT confirms a ureteral stone. Pain is controlled with interventions in the Emergency Department. There is no fever or evidence of a urinary tract infection. The patient will be discharged with opioid analgesics and Ibuprofen for pain. Flomax will be prescribed daily to attempt to ease stone passage.   Zofran was prescribed for nausea.  I considered other etiologies for these symptoms including AAA and pyelonephritis but these are unlikely given the otherwise normal CT scan and urinalysis.  The patient is instructed to return if increasing pain not controlled with pain meds, vomiting, and fever. Strain urine to look for stone, if detected, submit to primary doctor for lab analysis. Instructions were given to follow up with urology within one week, sooner if pain continues, as retrieval of the stone may be required for refractory symptoms.    Diagnosis:    ICD-10-CM    1. Ureterolithiasis  N20.1    2. Renal colic on right side  N23      Disposition:   The patient is discharged to home.     Discharge Medications:  New Prescriptions    ONDANSETRON (ZOFRAN ODT) 4 MG ODT TAB    Take 1-2 tablets (4-8 mg) by mouth every 8 hours as needed for nausea or vomiting    TAMSULOSIN (FLOMAX) 0.4 MG CAPSULE    Take 1 capsule (0.4 mg) by mouth daily for 10 doses     Scribe Disclosure:  I, Orla Severson, am serving as a scribe at 12:12 AM on 10/12/2020 to document services personally performed by Maryjane Serna MD based on my observations and the provider's statements to me.     [facility] EMERGENCY DEPT         Maryjane Serna MD  10/12/20 0600

## 2020-10-12 NOTE — ED AVS SNAPSHOT
[facility] Emergency Dept  [address] [facility] [address]  Phone: [phone]  Fax: [phone]                                    Alfonso Ferris   MRN: [id number]    Department: [facility] Emergency Dept   Date of Visit: 10/11/2020           After Visit Summary Signature Page    I have received my discharge instructions, and my questions have been answered. I have discussed any challenges I see with this plan with the nurse or doctor.    ..........................................................................................................................................  Patient/Patient Representative Signature      ..........................................................................................................................................  Patient Representative Print Name and Relationship to Patient    ..................................................               ................................................  Date                                   Time    ..........................................................................................................................................  Reviewed by Signature/Title    ...................................................              ..............................................  Date                                               Time          22EPIC Rev 08/18

## 2020-10-13 ENCOUNTER — ANCILLARY PROCEDURE (OUTPATIENT)
Dept: GENERAL RADIOLOGY | Facility: CLINIC | Age: 65
End: 2020-10-13
Attending: FAMILY MEDICINE

## 2020-10-13 ENCOUNTER — OFFICE VISIT (OUTPATIENT)
Dept: ORTHOPEDICS | Facility: CLINIC | Age: 65
End: 2020-10-13

## 2020-10-13 VITALS
HEIGHT: 71 IN | DIASTOLIC BLOOD PRESSURE: 72 MMHG | WEIGHT: 200 LBS | BODY MASS INDEX: 28 KG/M2 | SYSTOLIC BLOOD PRESSURE: 134 MMHG

## 2020-10-13 DIAGNOSIS — S62.525D CLOSED NONDISPLACED FRACTURE OF DISTAL PHALANX OF LEFT THUMB WITH ROUTINE HEALING, SUBSEQUENT ENCOUNTER: Primary | ICD-10-CM

## 2020-10-13 DIAGNOSIS — S62.609A FINGER FRACTURE: ICD-10-CM

## 2020-10-13 PROCEDURE — 99214 OFFICE O/P EST MOD 30 MIN: CPT | Performed by: FAMILY MEDICINE

## 2020-10-13 PROCEDURE — 73140 X-RAY EXAM OF FINGER(S): CPT | Mod: LT | Performed by: FAMILY MEDICINE

## 2020-10-13 ASSESSMENT — MIFFLIN-ST. JEOR: SCORE: 1724.32

## 2020-10-13 NOTE — PROGRESS NOTES
[facility]    [facility]   Follow-up Visit s Oct 13, 2020    PCP: [facility], [address]      Subjective:  Alfonso is a 64 year old male who is seen in follow up for evaluation of   Chief Complaint   Patient presents with     Left Thumb - Follow Up     His last visit was on 9/22/2020.  Since that time, symptoms have been better than before. Alfonso Ferris is accompanied today by self.        Patient reports 70% improvement since last visit. He is 3.5 weeks out from injury.  Patient has noticed improved symptoms with Alumafoam splint treatment.  Patient has slight swelling  Pain is located left thumb DIP, intermittent.  Patient is using no aids.    Patient denies any new injuries.    Patient's past medical, surgical, social and family histories are reviewed today.    History from previous visit on 9/22/2020        History from previous visit on 9/22/2020   [facility]   Clinic Visit s Sep 22, 2020        Today's Visit:  Alfonso is a 64 year old male who is seen in consultation at the request of Dr. Starks for   Chief Complaint   Patient presents with     Left Thumb - Follow Up       Injury: Patient describes injury as of Saturday night, 830pm, smashed with a sledge hammer.  Patient was splitting l;ogs for fireplace and hit thumb.      Right hand dominant    Location of Pain: left thumb dip joint, nonradiating   Duration of Pain: acute, 5 day(s),   Rating of Pain at worst: 10/10  Rating of Pain Currently: 2/10  Pain is better with: splinted   Pain is worse with: nothing  Treatment so far consists of: ice occasionally, splinted  Associated symptoms: pain  Recent imaging completed: X-rays completed Sunday 9/20.  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       Past Medical History:   Diagnosis Date     Arthritis     DJD hips     Pain in left hip        Patient Active Problem List    Diagnosis Date Noted     OA  "(osteoarthritis) 07/02/2012     Priority: Medium       Family History   Problem Relation Age of Onset     Coronary Artery Disease Mother      Diabetes Mother      Hypertension Mother      Colon Cancer Father      Cerebrovascular Disease Maternal Grandmother        Social History     Socioeconomic History     Marital status:      Spouse name: Not on file     Number of children: Not on file     Years of education: Not on file     Highest education level: Not on file   Occupational History     Not on file   Social Needs     Financial resource strain: Not on file     Food insecurity     Worry: Not on file     Inability: Not on file     Transportation needs     Medical: Not on file     Non-medical: Not on file   Tobacco Use     Smoking status: Never Smoker     Smokeless tobacco: Never Used   Substance and Sexual Activity     Alcohol use: Yes     Comment: one-two per week       Past Surgical History:   Procedure Laterality Date     ARTHROPLASTY MINIMALLY INVASIVE HIP  7/2/2012    Procedure: ARTHROPLASTY MINIMALLY INVASIVE HIP;  Left Total Hip Arthroplasty Minimally Invasive Two Incision;  Surgeon: Ulises Oliver MD;  [facility]             Review of Systems   Musculoskeletal: Positive for joint pain.   All other systems reviewed and are negative.        Physical Exam    Ht 1.803 m (5' 11\")   Wt 90.7 kg (200 lb)   BMI 27.89 kg/m    Constitutional:well-developed, well-nourished, and in no distress.   Cardiovascular: Intact distal pulses.    Neurological: alert. Gait Normal:   Gait, station, stance, and balance appear normal for age  Skin: Skin is warm and dry.   Psychiatric: Mood and affect normal.   Respiratory: unlabored, speaks in full sentences  Lymph: no LAD, no lymphangitis    Left Hand Exam     Tenderness   Left hand tenderness location: Distal phalanx thumb.     Range of Motion   Wrist   Extension: normal   Flexion: normal   Pronation: normal   Supination: normal   Hand   MP Thumb: 20   DIP " Thumb: 20     Muscle Strength   The patient has normal left wrist strength.    Other   Erythema: absent  Scars: absent  Sensation: decreased  Pulse: present    Comments:  Resolved bruising, trace swelling persists.  Nail appears discolored, but remains in place.  Heaping at base of nail bed suggests new nail under growth.  Tenderness in the distal phalanx, improved.                 X-ray images Previously done and independently reviewed by me in the office today with the patient. X-ray shows:   Recent Results (from the past 744 hour(s))   XR Finger Left G/E 2 Views    Narrative    LEFT FINGER TWO OR MORE VIEWS  9/20/2020 10:48 AM     HISTORY: Injury of left thumb, initial encounter.    COMPARISON: None.      Impression    IMPRESSION: Comminuted and minimally distracted fracture through the  distal phalanx of the first digit. There are two prominent fracture  fragments.    TANNER BUI MD       X-ray images Ordered and independently reviewed by me in the office today with the patient. X-ray shows:   Recent Results (from the past 744 hour(s))   XR Finger Left G/E 2 Views    Narrative    LEFT FINGER TWO OR MORE VIEWS  9/20/2020 10:48 AM     HISTORY: Injury of left thumb, initial encounter.    COMPARISON: None.      Impression    IMPRESSION: Comminuted and minimally distracted fracture through the  distal phalanx of the first digit. There are two prominent fracture  fragments.    TANNER BUI MD                      XR Finger LT G/E 2 vw    Narrative    10/13/2020    Comminuted tuft fracture of the distal phalanx of the thumb as previously   noted, no significant interval healing.  No new fractures.  Stable   alignment.     ASSESSMENT/PLAN    ICD-10-CM    1. Closed nondisplaced fracture of distal phalanx of left thumb with routine healing, subsequent encounter  S62.525D      Stable healing tuft fracture.  Continue splinting for protection with active use over the next 3 to 4 weeks.  May remove for light duty and  sleeping.  Return as needed.

## 2020-10-13 NOTE — LETTER
10/13/2020         RE: Alfonso Ferris  [address]  [facility] [address]        Dear Colleague,    Thank you for referring your patient, Alfonso Ferris, to the [facility] [place]. Please see a copy of my visit note below.    [facility]   Follow-up Visit s Oct 13, 2020    PCP: Betty, [place]      Subjective:  Alfonso is a 64 year old male who is seen in follow up for evaluation of   Chief Complaint   Patient presents with     Left Thumb - Follow Up     His last visit was on 9/22/2020.  Since that time, symptoms have been better than before. Alfonso Ferris is accompanied today by self.        Patient reports 70% improvement since last visit. He is 3.5 weeks out from injury.  Patient has noticed improved symptoms with Alumafoam splint treatment.  Patient has slight swelling  Pain is located left thumb DIP, intermittent.  Patient is using no aids.    Patient denies any new injuries.    Patient's past medical, surgical, social and family histories are reviewed today.    History from previous visit on 9/22/2020        History from previous visit on 9/22/2020   [facility]   Clinic Visit s Sep 22, 2020        Today's Visit:  Alfonso is a 64 year old male who is seen in consultation at the request of Dr. Starks for   Chief Complaint   Patient presents with     Left Thumb - Follow Up       Injury: Patient describes injury as of Saturday night, 830pm, smashed with a sledge hammer.  Patient was splitting l;ogs for fireplace and hit thumb.      Right hand dominant    Location of Pain: left thumb dip joint, nonradiating   Duration of Pain: acute, 5 day(s),   Rating of Pain at worst: 10/10  Rating of Pain Currently: 2/10  Pain is better with: splinted   Pain is worse with: nothing  Treatment so far consists of: ice occasionally, splinted  Associated symptoms: pain  Recent imaging completed: X-rays completed Sunday  "9/20.  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       Past Medical History:   Diagnosis Date     Arthritis     DJD hips     Pain in left hip        Patient Active Problem List    Diagnosis Date Noted     OA (osteoarthritis) 07/02/2012     Priority: Medium       Family History   Problem Relation Age of Onset     Coronary Artery Disease Mother      Diabetes Mother      Hypertension Mother      Colon Cancer Father      Cerebrovascular Disease Maternal Grandmother        Social History     Socioeconomic History     Marital status:      Spouse name: Not on file     Number of children: Not on file     Years of education: Not on file     Highest education level: Not on file   Occupational History     Not on file   Social Needs     Financial resource strain: Not on file     Food insecurity     Worry: Not on file     Inability: Not on file     Transportation needs     Medical: Not on file     Non-medical: Not on file   Tobacco Use     Smoking status: Never Smoker     Smokeless tobacco: Never Used   Substance and Sexual Activity     Alcohol use: Yes     Comment: one-two per week       Past Surgical History:   Procedure Laterality Date     ARTHROPLASTY MINIMALLY INVASIVE HIP  7/2/2012    Procedure: ARTHROPLASTY MINIMALLY INVASIVE HIP;  Left Total Hip Arthroplasty Minimally Invasive Two Incision;  Surgeon: Ulises Oliver MD;  Location: [facility]             Review of Systems   Musculoskeletal: Positive for joint pain.   All other systems reviewed and are negative.        Physical Exam    Ht 1.803 m (5' 11\")   Wt 90.7 kg (200 lb)   BMI 27.89 kg/m    Constitutional:well-developed, well-nourished, and in no distress.   Cardiovascular: Intact distal pulses.    Neurological: alert. Gait Normal:   Gait, station, stance, and balance appear normal for age  Skin: Skin is warm and dry.   Psychiatric: Mood and affect normal.   Respiratory: unlabored, speaks in full " sentences  Lymph: no LAD, no lymphangitis    Left Hand Exam     Tenderness   Left hand tenderness location: Distal phalanx thumb.     Range of Motion   Wrist   Extension: normal   Flexion: normal   Pronation: normal   Supination: normal   Hand   MP Thumb: 20   DIP Thumb: 20     Muscle Strength   The patient has normal left wrist strength.    Other   Erythema: absent  Scars: absent  Sensation: decreased  Pulse: present    Comments:  Resolved bruising, trace swelling persists.  Nail appears discolored, but remains in place.  Heaping at base of nail bed suggests new nail under growth.  Tenderness in the distal phalanx, improved.                 X-ray images Previously done and independently reviewed by me in the office today with the patient. X-ray shows:   Recent Results (from the past 744 hour(s))   XR Finger Left G/E 2 Views    Narrative    LEFT FINGER TWO OR MORE VIEWS  9/20/2020 10:48 AM     HISTORY: Injury of left thumb, initial encounter.    COMPARISON: None.      Impression    IMPRESSION: Comminuted and minimally distracted fracture through the  distal phalanx of the first digit. There are two prominent fracture  fragments.    TANNER BUI MD       X-ray images Ordered and independently reviewed by me in the office today with the patient. X-ray shows:   Recent Results (from the past 744 hour(s))   XR Finger Left G/E 2 Views    Narrative    LEFT FINGER TWO OR MORE VIEWS  9/20/2020 10:48 AM     HISTORY: Injury of left thumb, initial encounter.    COMPARISON: None.      Impression    IMPRESSION: Comminuted and minimally distracted fracture through the  distal phalanx of the first digit. There are two prominent fracture  fragments.    TANNER BUI MD                      XR Finger LT G/E 2 vw    Narrative    10/13/2020    Comminuted tuft fracture of the distal phalanx of the thumb as previously   noted, no significant interval healing.  No new fractures.  Stable   alignment.     ASSESSMENT/PLAN    ICD-10-CM     1. Closed nondisplaced fracture of distal phalanx of left thumb with routine healing, subsequent encounter  S62.525D      Stable healing tuft fracture.  Continue splinting for protection with active use over the next 3 to 4 weeks.  May remove for light duty and sleeping.  Return as needed.            Again, thank you for allowing me to participate in the care of your patient.        Sincerely,        Sidney Harper MD